# Patient Record
Sex: FEMALE | Race: WHITE | NOT HISPANIC OR LATINO | URBAN - METROPOLITAN AREA
[De-identification: names, ages, dates, MRNs, and addresses within clinical notes are randomized per-mention and may not be internally consistent; named-entity substitution may affect disease eponyms.]

---

## 2020-12-07 ENCOUNTER — APPOINTMENT (RX ONLY)
Dept: URBAN - METROPOLITAN AREA CLINIC 162 | Facility: CLINIC | Age: 70
Setting detail: DERMATOLOGY
End: 2020-12-07

## 2020-12-07 DIAGNOSIS — Z71.89 OTHER SPECIFIED COUNSELING: ICD-10-CM

## 2020-12-07 DIAGNOSIS — L73.9 FOLLICULAR DISORDER, UNSPECIFIED: ICD-10-CM

## 2020-12-07 DIAGNOSIS — L663 OTHER SPECIFIED DISEASES OF HAIR AND HAIR FOLLICLES: ICD-10-CM

## 2020-12-07 DIAGNOSIS — L82.1 OTHER SEBORRHEIC KERATOSIS: ICD-10-CM

## 2020-12-07 DIAGNOSIS — L738 OTHER SPECIFIED DISEASES OF HAIR AND HAIR FOLLICLES: ICD-10-CM

## 2020-12-07 DIAGNOSIS — B02.29 OTHER POSTHERPETIC NERVOUS SYSTEM INVOLVEMENT: ICD-10-CM

## 2020-12-07 DIAGNOSIS — L57.8 OTHER SKIN CHANGES DUE TO CHRONIC EXPOSURE TO NONIONIZING RADIATION: ICD-10-CM

## 2020-12-07 DIAGNOSIS — L57.0 ACTINIC KERATOSIS: ICD-10-CM

## 2020-12-07 PROBLEM — L02.821 FURUNCLE OF HEAD [ANY PART, EXCEPT FACE]: Status: ACTIVE | Noted: 2020-12-07

## 2020-12-07 PROCEDURE — ? PRESCRIPTION

## 2020-12-07 PROCEDURE — 17000 DESTRUCT PREMALG LESION: CPT

## 2020-12-07 PROCEDURE — 17003 DESTRUCT PREMALG LES 2-14: CPT

## 2020-12-07 PROCEDURE — ? COUNSELING

## 2020-12-07 PROCEDURE — 99203 OFFICE O/P NEW LOW 30 MIN: CPT | Mod: 25

## 2020-12-07 PROCEDURE — ? ORDER TESTS

## 2020-12-07 PROCEDURE — ? LIQUID NITROGEN (CM)

## 2020-12-07 PROCEDURE — ? ADDITIONAL NOTES

## 2020-12-07 RX ORDER — DOXYCYCLINE HYCLATE 100 MG/1
CAPSULE, GELATIN COATED ORAL
Qty: 28 | Refills: 0 | Status: ERX | COMMUNITY
Start: 2020-12-07

## 2020-12-07 RX ADMIN — DOXYCYCLINE HYCLATE: 100 CAPSULE, GELATIN COATED ORAL at 00:00

## 2020-12-07 ASSESSMENT — LOCATION SIMPLE DESCRIPTION DERM
LOCATION SIMPLE: RIGHT SCALP
LOCATION SIMPLE: LEFT ZYGOMA
LOCATION SIMPLE: RIGHT CHEEK
LOCATION SIMPLE: LEFT CHEEK

## 2020-12-07 ASSESSMENT — LOCATION DETAILED DESCRIPTION DERM
LOCATION DETAILED: LEFT CENTRAL MALAR CHEEK
LOCATION DETAILED: LEFT LATERAL MALAR CHEEK
LOCATION DETAILED: RIGHT CENTRAL MALAR CHEEK
LOCATION DETAILED: RIGHT MEDIAL FRONTAL SCALP
LOCATION DETAILED: LEFT LATERAL ZYGOMA

## 2020-12-07 ASSESSMENT — LOCATION ZONE DERM
LOCATION ZONE: FACE
LOCATION ZONE: SCALP

## 2020-12-07 NOTE — PROCEDURE: ORDER TESTS
Billing Type: Third-Party Bill
Performing Laboratory: -[id number]
Expected Date Of Service: 12/07/2020
Bill For Surgical Tray: no

## 2020-12-07 NOTE — PROCEDURE: LIQUID NITROGEN
[facility]    [address] [address]    Phone:  [phone]       Thank You for choosing us for your health care visit. We are glad to serve you and happy to provide you with this summary of your visit. Please help us to ensure we have accurate records. If you find anything that needs to be changed, please let our staff know as soon as possible.          Your Demographic Information     Patient Name Sex Tiffany Hogue Female 1941       Ethnic Group Patient Race    Not of  or  Origin White      Your Visit Details     Date & Time Provider Department    2017 10:15 AM Mark Modi MD [facility]      Your Upcoming Appointment*(Max 10)     2017 10:00 AM CST   Nurse Visit with [facility] ANTICOAG NURSE 2   [facility] ([facility])    [address] [address]   [phone]            2017  8:45 AM CST   Office Visit with Mark Modi MD   [facility] [address] ([facility])    [address] [address]   [phone]            2017  8:00 AM CST   Office Visit with Chyna Maxwell DPM   [facility] Podiatry ([facility])    [address] [address]   [phone]              8:20 AM CST   Pacer check office with Nimisha Gutierrez NP, [facility] DEVICE CHECK   [facility] Electrophysiology ([facility])    [address] [address]   [phone]            2017  7:40 AM CDT   Follow-up Visit with Saira Barajas MD   [facility] Rheumatology ([facility])    [address] [place] [address]   [phone]              2:20 PM CDT   
  Follow-up Visit with Colten Amaya MD   [facility] ([facility])    [address] [place] [address] [phone]            Tuesday October 17, 2017  9:30 AM CDT   Medicare Well Visit with Mark Modi MD   [facility] [address] ([facility])    [address] [place] [address] [phone]              Your To Do List     Follow-Up    Return if symptoms worsen or fail to improve.      Conditions Discussed Today or Order-Related Diagnoses        Comments    Cough due to bronchospasm    -  Primary     Acute bacterial bronchitis         Encounter for therapeutic drug monitoring         Controlled type 2 diabetes mellitus with stage 3 chronic kidney disease, with long-term current use of insulin           Your Vitals Were     BP Pulse Temp Resp Height Weight    116/68 98 97.8 °F (36.6 °C) (Oral) 18 5' 4\" (1.626 m) 220 lb 7.4 oz (100 kg)    SpO2 BMI Smoking Status             99% 37.84 kg/m2 Never Smoker         Medications Prescribed or Re-Ordered Today     azithromycin (ZITHROMAX) 500 MG tablet    Sig - Route: Take 1 tablet by mouth daily for 5 days. - Oral    Class: Eprescribe    Pharmacy: [facility] [address] [place] [address] [address] AT [place] [address] Ph #: [phone]    albuterol 108 (90 BASE) MCG/ACT inhaler    Sig - Route: Inhale 2 puffs into the lungs every 4 hours as needed for Shortness of Breath or Wheezing. - Inhalation    Class: Eprescribe    Pharmacy: [facility] [address] [place] [address] [address] AT [place] [address] Ph #: [phone]      Your Current Medications Are        Disp Refills Start End    azithromycin (ZITHROMAX) 500 MG tablet 5 tablet 0 1/2/2017 1/7/2017    Sig - Route: Take 1 tablet by mouth daily for 5 days. - Oral    Class: Eprescribe    albuterol 108 (90 BASE) MCG/ACT inhaler 3 Inhaler 0 1/2/2017     Sig - Route: Inhale 2 puffs into the 
lungs every 4 hours as needed for Shortness of Breath or Wheezing. - Inhalation    Class: Eprescribe    metformin (GLUCOPHAGE-XR) 500 MG 24 hr tablet 180 tablet 0 12/14/2016     Sig: TAKE 2 TABLETS DAILY WITH BREAKFAST    Class: Eprescribe    lactobacillus acidophilus (BACID) 20 tablet 0 12/2/2016     Sig - Route: Take 1 tablet by mouth 2 times daily. - Oral    Class: Eprescribe    potassium chloride 10 MEQ CR tablet 180 tablet 2 11/30/2016     Sig: TAKE 1 TABLET TWICE A DAY    Class: Eprescribe    Calcium Carbonate-Vitamin D (CALCIUM 500 + D PO)        Sig - Route: Take 1 tablet by mouth daily. - Oral    Class: Historical Med    Cyanocobalamin (B-12 PO)        Sig - Route: Take 1 tablet by mouth daily. - Oral    Class: Historical Med    insulin lispro (HUMALOG) 100 UNIT/ML sliding scale injection        Sig - Route: Inject into the skin 3 times daily (before meals). Sliding Scale Insulin  For Blood Glucose  Less than 150 Give No Insulin  150-200 Give 2 units insulin SubQ  201-250 Give 4 units insulin SubQ  251-300 Give 6 units insulin SubQ  301-350 Give 8 units insulin SubQ  351-400 Give 10 units insulin SubQ  Greater than 400 No insulin and call MD - Subcutaneous    Class: Historical Med    magnesium 250 MG tablet        Sig - Route: Take 250 mg by mouth daily. - Oral    Class: Historical Med    cholecalciferol (VITAMIN D3) 1000 UNITS tablet        Sig - Route: Take 1,000 Units by mouth 2 times daily. - Oral    Class: Historical Med    Multiple Vitamins-Minerals (EYE VITAMINS) Tab        Sig - Route: Take 1 tablet by mouth daily. - Oral    Class: Historical Med    warfarin (COUMADIN) 5 MG tablet        Sig - Route: Take by mouth as directed. Take 1 tablet (5mg) by mouth daily except on Wednesday take 1/2 tablet (2.5mg) by mouth. - Oral    Class: Historical Med    glimepiride (AMARYL) 4 MG tablet 90 tablet 0 11/28/2016     Sig - Route: Take 1 tablet by mouth daily (before breakfast). - Oral    Class: Eprescribe    
Cosign for Ordering: Accepted by Mark Modi MD on 11/28/2016  3:05 PM    furosemide (LASIX) 80 MG tablet 90 tablet 0 11/22/2016     Sig - Route: Take 1 tablet by mouth daily. - Oral    Class: Eprescribe    Cosign for Ordering: Accepted by Mark Modi MD on 11/22/2016 12:47 PM    atorvastatin (LIPITOR) 40 MG tablet 90 tablet 3 11/22/2016     Sig - Route: Take 1 tablet by mouth daily. - Oral    Class: Eprescribe    allopurinol (ZYLOPRIM) 100 MG tablet 180 tablet 0 11/4/2016     Sig: TAKE 2 TABLETS DAILY    Class: Eprescribe    amoxicillin (AMOXIL) 500 MG capsule 4 capsule 1 10/12/2016     Sig: Take 4 capsules one hour prior to dental procedure.    Class: Eprescribe    Cosign for Ordering: Accepted by Mark Modi MD on 10/12/2016  9:35 AM    omeprazole (PRILOSEC) 20 MG capsule 180 capsule 1 10/3/2016     Sig - Route: Take 1 capsule by mouth 2 times daily. - Oral    Class: Eprescribe    Cosign for Ordering: Accepted by Mark Modi MD on 10/3/2016 10:25 AM    folic acid (FOLATE) 1 MG tablet 360 tablet 3 8/1/2016     Sig - Route: Take 4 tablets by mouth daily. - Oral    Class: Eprescribe    predniSONE (DELTASONE) 5 MG tablet 90 tablet 3 7/27/2016     Sig - Route: Take 1 tablet by mouth daily. - Oral    Class: Eprescribe    Cosign for Ordering: Accepted by Mark Modi MD on 7/27/2016  8:51 AM    predniSONE (DELTASONE) 1 MG tablet 180 tablet 3 7/27/2016     Sig - Route: Take 2 tablets by mouth daily. - Oral    Class: Eprescribe    Cosign for Ordering: Accepted by Mark Modi MD on 7/27/2016  8:51 AM    HUMULIN N KWIKPEN 100 UNIT/ML injection 90 mL 1 7/6/2016     Sig: INJECT 20 UNITS UNDER THE SKIN DAILY    Class: Eprescribe    calcitRIOL (ROCALTROL) 0.25 MCG capsule 90 capsule 3 5/18/2016     Sig - Route: Take 1 capsule by mouth daily. - Oral    Class: Eprescribe    lisinopril (ZESTRIL) 10 MG tablet 180 tablet 2 5/2/2016     Sig: TAKE 1 TABLET TWICE A DAY    Class: Eprescribe    aspirin 81 MG tablet     
   Sig - Route: Take 81 mg by mouth daily. - Oral    Class: Historical Med    ferrous sulfate 325 (65 FE) MG tablet        Sig - Route: Take 325 mg by mouth daily. - Oral    Class: Historical Med    Multiple Vitamin (MULTIVITAMINS PO)        Sig - Route: Take 1 tablet by mouth daily.  - Oral    Class: Historical Med      Allergies     Latex     Shell Fish     Skelaxin       Immunizations History as of 1/2/2017     Name Date    HERPES ZOSTER SHINGLES 7/8/2013    Influenza 9/17/2013    Influenza High Dose Pres Free 9/13/2016 10:15 AM, 10/5/2015 10:36 AM, 9/30/2014    Pneumococcal Conjugate 13 Valent 6/2/2015    Pneumococcal Polysaccharide Adult 1/12/2009    Td:Adult type tetanus/diphtheria 6/17/2013      Problem List as of 1/2/2017     [employer] dual chamber pacemaker 10/10/2012    Valvular heart disease    Hyperlipidemia    Diabetes mellitus type 2, insulin dependent    Hypertensive heart disease    History of congestive heart failure    Vagal nerve stimulator history    Claudication    Chronic kidney disease, stage III (moderate)    Giant cell arteritis    Generalized osteoarthrosis, unspecified site    Lateral malleolar fracture    H/O calcium pyrophosphate deposition disease (CPPD)    Polyneuropathy in diabetes(357.2)    Thrombus    Abnormal echocardiogram    Encounter for therapeutic drug monitoring    Long term (current) use of anticoagulants Coumadin    Vitamin D deficiency    Secondary hyperparathyroidism of renal origin    Anemia in CKD (chronic kidney disease)    Iron deficiency    Sleep apnea    Encounter for long-term (current) use of steroids    Bursitis of right shoulder    DM (diabetes mellitus) type II controlled with renal manifestation    Type 2 diabetes, uncontrolled, with neuropathy    Chronic hypokalemia    Chronic radicular low back pain    Acute back pain with sciatica    Primary osteoarthritis of both knees    Aortic valve disorder    Subconjunctival hemorrhage    Bilateral leg edema    CHB 
(complete heart block)    PAF (paroxysmal atrial fibrillation)    Myalgia    Diarrhea in adult patient    Orthostatic hypotension              Patient Instructions    Please get INR checked Wednesday at lab or coumadin clinic.       
Post-Care Instructions: I reviewed with the patient in detail post-care instructions. Patient is to wear sunprotection, and avoid picking at any of the treated lesions. Pt may apply Vaseline to crusted or scabbing areas.
Duration Of Freeze Thaw-Cycle (Seconds): 0
Detail Level: Detailed
Consent: The patient's consent was obtained including but not limited to risks of crusting, scabbing, blistering, scarring, darker or lighter pigmentary change, recurrence, incomplete removal and infection.

## 2020-12-23 ENCOUNTER — RX ONLY (OUTPATIENT)
Age: 70
Setting detail: RX ONLY
End: 2020-12-23

## 2020-12-23 RX ORDER — CLINDAMYCIN PHOSPHATE 10 MG/ML
SOLUTION TOPICAL
Qty: 1 | Refills: 1 | Status: ERX | COMMUNITY
Start: 2020-12-23

## 2021-01-05 ENCOUNTER — RX ONLY (OUTPATIENT)
Age: 71
Setting detail: RX ONLY
End: 2021-01-05

## 2021-01-05 RX ORDER — CLINDAMYCIN PHOSPHATE 10 MG/ML
SOLUTION TOPICAL
Qty: 1 | Refills: 1 | Status: ERX